# Patient Record
Sex: MALE | Race: WHITE | HISPANIC OR LATINO | URBAN - METROPOLITAN AREA
[De-identification: names, ages, dates, MRNs, and addresses within clinical notes are randomized per-mention and may not be internally consistent; named-entity substitution may affect disease eponyms.]

---

## 2020-06-20 ENCOUNTER — OFFICE VISIT (OUTPATIENT)
Dept: URGENT CARE | Facility: CLINIC | Age: 40
End: 2020-06-20

## 2020-06-20 VITALS
OXYGEN SATURATION: 98 % | BODY MASS INDEX: 31.89 KG/M2 | HEART RATE: 62 BPM | DIASTOLIC BLOOD PRESSURE: 74 MMHG | WEIGHT: 180 LBS | HEIGHT: 63 IN | TEMPERATURE: 98 F | SYSTOLIC BLOOD PRESSURE: 118 MMHG

## 2020-06-20 DIAGNOSIS — S61.211A LACERATION OF LEFT INDEX FINGER, FOREIGN BODY PRESENCE UNSPECIFIED, NAIL DAMAGE STATUS UNSPECIFIED, INITIAL ENCOUNTER: Primary | ICD-10-CM

## 2020-06-20 DIAGNOSIS — S67.191A CRUSHING INJURY OF LEFT INDEX FINGER, INITIAL ENCOUNTER: ICD-10-CM

## 2020-06-20 PROCEDURE — 90715 TDAP VACCINE GREATER THAN OR EQUAL TO 7YO IM: ICD-10-PCS | Mod: S$GLB,,, | Performed by: PHYSICIAN ASSISTANT

## 2020-06-20 PROCEDURE — 12042 LACERATION REPAIR: ICD-10-PCS | Mod: S$GLB,,, | Performed by: PHYSICIAN ASSISTANT

## 2020-06-20 PROCEDURE — 99204 PR OFFICE/OUTPT VISIT, NEW, LEVL IV, 45-59 MIN: ICD-10-PCS | Mod: TIER,25,S$GLB, | Performed by: PHYSICIAN ASSISTANT

## 2020-06-20 PROCEDURE — 12042 INTMD RPR N-HF/GENIT2.6-7.5: CPT | Mod: S$GLB,,, | Performed by: PHYSICIAN ASSISTANT

## 2020-06-20 PROCEDURE — 73130 XR HAND COMPLETE 3 VIEW LEFT: ICD-10-PCS | Mod: FY,LT,S$GLB, | Performed by: RADIOLOGY

## 2020-06-20 PROCEDURE — 90471 TDAP VACCINE GREATER THAN OR EQUAL TO 7YO IM: ICD-10-PCS | Mod: S$GLB,,, | Performed by: PHYSICIAN ASSISTANT

## 2020-06-20 PROCEDURE — 99204 OFFICE O/P NEW MOD 45 MIN: CPT | Mod: TIER,25,S$GLB, | Performed by: PHYSICIAN ASSISTANT

## 2020-06-20 PROCEDURE — 90715 TDAP VACCINE 7 YRS/> IM: CPT | Mod: S$GLB,,, | Performed by: PHYSICIAN ASSISTANT

## 2020-06-20 PROCEDURE — 90471 IMMUNIZATION ADMIN: CPT | Mod: S$GLB,,, | Performed by: PHYSICIAN ASSISTANT

## 2020-06-20 PROCEDURE — 73130 X-RAY EXAM OF HAND: CPT | Mod: FY,LT,S$GLB, | Performed by: RADIOLOGY

## 2020-06-20 RX ORDER — CEPHALEXIN 500 MG/1
500 CAPSULE ORAL EVERY 8 HOURS
Qty: 30 CAPSULE | Refills: 0 | Status: SHIPPED | OUTPATIENT
Start: 2020-06-20 | End: 2020-06-30

## 2020-06-20 RX ORDER — MUPIROCIN 20 MG/G
OINTMENT TOPICAL
Qty: 22 G | Refills: 0 | Status: SHIPPED | OUTPATIENT
Start: 2020-06-20

## 2020-06-20 NOTE — PROCEDURES
Laceration Repair    Date/Time: 6/20/2020 9:35 AM  Performed by: Brie Nair PA-C  Authorized by: Brie Nair PA-C   Body area: upper extremity  Location details: left index finger  Laceration length: 4 cm  Foreign bodies: no foreign bodies  Tendon involvement: none  Nerve involvement: none  Vascular damage: no  Anesthesia: local infiltration    Anesthesia:  Local Anesthetic: lidocaine 2% without epinephrine  Anesthetic total: 5 mL  Patient sedated: no  Preparation: Patient was prepped and draped in the usual sterile fashion.  Irrigation solution: saline  Irrigation method: syringe  Amount of cleaning: extensive  Debridement: none  Degree of undermining: none  Skin closure: 5-0 Prolene (15)  Subcutaneous closure: 5-0 Vicryl (3 sutures placed SubQ)  Number of sutures: 18  Technique: simple  Approximation: close  Approximation difficulty: complex  Dressing: antibiotic ointment and non-stick sterile dressing (wrapped in coban)  Patient tolerance: Patient tolerated the procedure well with no immediate complications

## 2020-06-20 NOTE — PATIENT INSTRUCTIONS
Laceración, Extremidad [Laceration, Extremity, Sutures, Staples, Or Tape]  Bong laceración es bong cortada en la piel. Si es profunda, suele requerir puntos (suturas) o grapas para cerrarla y que pueda cicatrizar. Las cortadas menores pueden tratarse con cinta quirúrgica. Pueden realizarle radiografías si es posible que algo haya entrado en la herida a través del bao. También es posible que necesite que le coloquen la vacuna antitetánica. Se le colocará si no la tiene actualizada.     Cuidados En [place]     · Siga las instrucciones del proveedor de atención médica sobre cómo cuidar de la herida.  · Para ayudar a evitar bong infección, lávese las babak con agua y jabón antes y después de curar la herida.  · Mantenga la herida limpia y seca. Si le colocaron un vendaje y éste se moja o se ensucia, cámbielo. En los demás casos, déjelo puesto kelsey las primeras 24 horas y luego cámbielo bong vez por día o según le indiquen.   · Si usaron puntos o grapas limpie la herida todos los días:  · Después de quitar el vendaje, lave la brittney afectada con agua y jabón. Use un palillo de algodón (Q tip) para ablandar y limpiar la bear o las costras que se hayan formado.  · Después de edgar limpiado, mantenga la herida limpia y seca. Hable con irving médico antes de aplicarse ninguna pomada antibiótica en la herida. Vuelva a poner el vendaje.   · Puede quitar el vendaje para ducharse tej de costumbre bong vez transcurridas 24 horas, bong no moje la brittney afectada en agua (no se sumerja en bañeras o piscinas) hasta que le hayan quitado los puntos o las grapas.  · Si usó cinta quirúrgica, mantenga la brittney limpia y seca. Si se moja, séquela con bong toalla sin frotarla.   · El médico puede recetarle bong crema o pomada antibiótica para evitar bong infección. No deje de yan estos medicamentos hasta completar el tratamiento o hasta que el médico le diga que deje de hacerlo. El médico puede recetarle medicamentos para el dolor. Tómelos de  acuerdo con las indicaciones del médico.  · Evite las actividades que puedan reabrir la herida.      Seguimiento  Programe bong visita de control con irving proveedor de atención médica. La mayoría de las lesiones en la piel sanan en lucia cecily. De todas formas, a veces es posible que ocurra bong infección aún con el tratamiento adecuado. Por lo tanto, revise la herida todos los días para best si muestra alguna de las señales de infección descritas a continuación. Los puntos o las grapas deben quitarse en un plazo de 7-14 días. Si usaron cinta quirúrgica, puede quitársela al cabo de 10 días si no se ha caído todavía.     Busque Atención Médica  Llame de inmediato a irving proveedor de atención médica si algo de lo siguiente ocurre:  · Sangrado que no puede controlarse aplicando presión directa  · Señales de infección, incluidos aumento del dolor en la herida, enrojecimiento, hinchazón o pus o mal olor procedentes de la herida  · Fiebre de 100.4°F (38°C) o más baldo, o tej le haya indicado irving proveedor de atención médica  · Los puntos o las grapas se desprenden o se caen antes de los 7 días  · Los bordes de la herida se abren  · La herida cambia de color  · Adormecimiento alrededor de la herida  · Disminución de la movilidad alrededor de la brittney herida  Date Last Reviewed: 6/14/2015  © 3384-5013 [employer]. [address] [place] [id number]. Todos los derechos reservados. Esta información no pretende sustituir la atención médica profesional. Sólo irving médico puede diagnosticar y tratar un problema de ramon.      COME BACK IN 10 days on 6/30/2020 for stitches to come out.  If you notice any redness, increased swelling, warmth, pus draining from the wound or fever please come back to the urgent care immediately.  Take antibiotics to completion.  Do not get the wound wet for 24 hr.  After 24 hr he may wash the wound with warm water and soap, pat dry and apply mupirocin antibiotic ointment.  Do not apply  ointment after 4 days.  Keep wound covered for the next week.                                                            Laceration Repair   If your condition worsens or fails to improve we recommend that you receive another evaluation at the ER immediately or contact your PCP to discuss your concerns or return here. You must understand that you've received an urgent care treatment only and that you may be released before all your medical problems are known or treated. You the patient will arrange for followup care as instructed.   Use the prescription antibiotic ointment for 2-3 days only. Clean the wound twice a day with betadiene and apply the ointment. After that, only use a dry bandage as the ointment will keep the laceration too moist.   Suture removal on face in 5 days   Suture removal on trunk or extremities in about 10 days.   Monitor for signs of infection such as redness, drainage, increase swelling or increase pain.   If you were given narcotics for pain do not drive or operate heavy equipment or machinery.   Tylenol or ibuprofen can also be used as directed for pain unless you have an allergy to them or medical condition such as stomach ulcers, kidney or liver disease or blood thinners etc for which you should not be taking these type of medications.       Please follow up with your Primary care provider within 2-5 days if your signs and symptoms have not resolved or worsen.     If your condition worsens or fails to improve we recommend that you receive another evaluation at the emergency room immediately or contact your primary medical clinic to discuss your concerns.   You must understand that you have received an Urgent Care treatment only and that you may be released before all of your medical problems are known or treated. You, the patient, will arrange for follow up care as instructed.     RED FLAGS/WARNING SYMPTOMS DISCUSSED WITH PATIENT THAT WOULD WARRANT EMERGENT MEDICAL ATTENTION. PATIENT  VERBALIZED UNDERSTANDING.

## 2020-06-20 NOTE — PROGRESS NOTES
"Subjective:       Patient ID: Danny Wells is a 40 y.o. male.    Vitals:  height is 5' 3" (1.6 m) and weight is 81.6 kg (180 lb). His tympanic temperature is 97.8 °F (36.6 °C). His blood pressure is 118/74 and his pulse is 62. His oxygen saturation is 98%.     Chief Complaint: Laceration    Patient speaks Georgian and his boss accompanied him at time of visit.  His boss translated.  Patient presents to urgent care with a laceration to the distal aspect of his left index finger.  Wound occurred 2 hr prior after his finger was smashed between a piece of steel and forklift.  No object sliced his finger.  Wound was obtained from crushing injury causing increased intra-phalangeal pressure making his distal index finger split open.  Bleeding is well controlled.  Wound was cleaned with bacitracin prior to visit.  Patient is unaware of his tetanus status.  Denies any numbness although expresses intermittent paresthesias.  Patient can make a fist.  Patient is accompanied at time of visit by his boss.  This incident did occur at work however he is not going to charge worker's comp rather he will pay for visit out of pocket.    Laceration   The incident occurred 1 to 3 hours ago (7:30). Pain location: left index finger. Injury mechanism: a piece of steel. The pain is at a severity of 8/10. The pain is moderate. The pain has been constant since onset. It is unknown if a foreign body is present. His tetanus status is out of date.       Constitution: Negative for activity change and fatigue.   HENT: Negative for facial swelling and facial trauma.    Neck: Negative for neck stiffness.   Cardiovascular: Negative for chest trauma.   Eyes: Negative for eye trauma, double vision and blurred vision.   Gastrointestinal: Negative for abdominal trauma, abdominal pain and rectal bleeding.   Genitourinary: Negative for hematuria, genital trauma and pelvic pain.   Musculoskeletal: Negative for pain, trauma, joint swelling, abnormal ROM of " joint and pain with walking.   Skin: Negative for color change, wound, abrasion, laceration and erythema.   Neurological: Negative for dizziness, history of vertigo, light-headedness, coordination disturbances, altered mental status and loss of consciousness.   Hematologic/Lymphatic: Negative for history of bleeding disorder.   Psychiatric/Behavioral: Negative for altered mental status.       Objective:      Physical Exam   Constitutional: He is oriented to person, place, and time. He appears well-developed.   HENT:   Head: Normocephalic and atraumatic. Head is without abrasion, without contusion and without laceration.   Right Ear: External ear normal.   Left Ear: External ear normal.   Nose: Nose normal.   Mouth/Throat: Oropharynx is clear and moist and mucous membranes are normal.   Eyes: Pupils are equal, round, and reactive to light. Conjunctivae, EOM and lids are normal.   Neck: Trachea normal, full passive range of motion without pain and phonation normal. Neck supple.   Cardiovascular: Normal rate, regular rhythm and normal heart sounds.   Pulmonary/Chest: Effort normal and breath sounds normal.   Musculoskeletal:         General: Swelling and tenderness present.      Left hand: He exhibits decreased range of motion (Patient unable to make a closed fist with left hand.), tenderness, laceration and swelling. He exhibits no bony tenderness, normal two-point discrimination, normal capillary refill and no deformity. Normal sensation noted. Decreased sensation is not present in the ulnar distribution, is not present in the medial redistribution and is not present in the radial distribution. Normal strength noted. He exhibits no finger abduction, no thumb/finger opposition and no wrist extension trouble.        Hands:    Neurological: He is alert and oriented to person, place, and time.   Skin: Skin is warm, dry, intact and no rash. Capillary refill takes less than 2 seconds. Lacerations - upper ext.:  left  handabrasion, burn, bruising, erythema and ecchymosis  Psychiatric: His speech is normal and behavior is normal. Judgment and thought content normal.   Nursing note and vitals reviewed.                      LEFT HAND XRAY:    Impression:     1. Laceration along the ulnar aspect of the 5th DIP joint with associated linear lucency extending along the tuft of the distal phalanx concerning for associated penetrating injury.  No radiopaque foreign bodies.    Assessment:       1. Laceration of left index finger, foreign body presence unspecified, nail damage status unspecified, initial encounter    2. Crushing injury of left index finger, initial encounter        Plan:       X-ray obtained to ensure no foreign body or fracture.  X-ray shows no evidence of radiopaque foreign bodies and bones are well mineralized intact without any fracture dislocation.  Reviewed results with patient.  Wound closure performed at time of visit with 3 absorbable and 15 nonabsorbable sutures placed.  Wound was cleaned copious irrigation and Hibiclens.  Injected with 2% lidocaine.  No neurovascular compromise noted.  Patient tolerated the procedure well.  Discussed that a hand surgery referral will be placed at this time for further evaluation of finger injury.  Patient is self pay and discussed the importance of this evaluation.  Discussed warning signs for infection and return to clinic should those present.  Oral Keflex prescribed for empiric empiric coverage.  Discussed not to get sutures wet within 24 hr.  Return to clinic in 10 days for suture removal.  Patient verbalized understanding and all of his questions were answered.    Laceration of left index finger, foreign body presence unspecified, nail damage status unspecified, initial encounter  -     (In Office Administered) Tdap Vaccine  -     XR HAND COMPLETE 3 VIEW LEFT; Future; Expected date: 06/20/2020  -     mupirocin (BACTROBAN) 2 % ointment; Apply to affected area 3 times daily  for 4 days  Dispense: 22 g; Refill: 0  -     cephALEXin (KEFLEX) 500 MG capsule; Take 1 capsule (500 mg total) by mouth every 8 (eight) hours. for 10 days  Dispense: 30 capsule; Refill: 0  -     Ambulatory referral/consult to Hand Surgery  -     Laceration Repair    Crushing injury of left index finger, initial encounter      Patient Instructions     Laceración, Extremidad [Laceration, Extremity, Sutures, Staples, Or Tape]  Bong laceración es bong cortada en la piel. Si es profunda, suele requerir puntos (suturas) o grapas para cerrarla y que pueda cicatrizar. Las cortadas menores pueden tratarse con cinta quirúrgica. Pueden realizarle radiografías si es posible que algo haya entrado en la herida a través del bao. También es posible que necesite que le coloquen la vacuna antitetánica. Se le colocará si no la tiene actualizada.     Cuidados En [place]     · Siga las instrucciones del proveedor de atención médica sobre cómo cuidar de la herida.  · Para ayudar a evitar bong infección, lávese las babak con agua y jabón antes y después de curar la herida.  · Mantenga la herida limpia y seca. Si le colocaron un vendaje y éste se moja o se ensucia, cámbielo. En los demás casos, déjelo puesto kelsey las primeras 24 horas y luego cámbielo bong vez por día o según le indiquen.   · Si usaron puntos o grapas limpie la herida todos los días:  · Después de quitar el vendaje, lave la brittney afectada con agua y jabón. Use un palillo de algodón (Q tip) para ablandar y limpiar la bear o las costras que se hayan formado.  · Después de edgar limpiado, mantenga la herida limpia y seca. Hable con irving médico antes de aplicarse ninguna pomada antibiótica en la herida. Vuelva a poner el vendaje.   · Puede quitar el vendaje para ducharse tej de costumbre bong vez transcurridas 24 horas, bong no moje la brittney afectada en agua (no se sumerja en bañeras o piscinas) hasta que le hayan quitado los puntos o las grapas.  · Si usó cinta quirúrgica,  mantenga la brittney limpia y seca. Si se moja, séquela con bong toalla sin frotarla.   · El médico puede recetarle bong crema o pomada antibiótica para evitar bong infección. No deje de yan estos medicamentos hasta completar el tratamiento o hasta que el médico le diga que deje de hacerlo. El médico puede recetarle medicamentos para el dolor. Tómelos de acuerdo con las indicaciones del médico.  · Evite las actividades que puedan reabrir la herida.      Seguimiento  Programe bong visita de control con irving proveedor de atención médica. La mayoría de las lesiones en la piel sanan en lucia cecily. De todas formas, a veces es posible que ocurra bong infección aún con el tratamiento adecuado. Por lo tanto, revise la herida todos los días para best si muestra alguna de las señales de infección descritas a continuación. Los puntos o las grapas deben quitarse en un plazo de 7-14 días. Si usaron cinta quirúrgica, puede quitársela al cabo de 10 días si no se ha caído todavía.     Busque Atención Médica  Llame de inmediato a irving proveedor de atención médica si algo de lo siguiente ocurre:  · Sangrado que no puede controlarse aplicando presión directa  · Señales de infección, incluidos aumento del dolor en la herida, enrojecimiento, hinchazón o pus o mal olor procedentes de la herida  · Fiebre de 100.4°F (38°C) o más baldo, o tej le haya indicado irving proveedor de atención médica  · Los puntos o las grapas se desprenden o se caen antes de los 7 días  · Los bordes de la herida se abren  · La herida cambia de color  · Adormecimiento alrededor de la herida  · Disminución de la movilidad alrededor de la brittney herida  Date Last Reviewed: 6/14/2015 © 2000-2017 [employer]. [address] [place] [id number]. Todos los derechos reservados. Esta información no pretende sustituir la atención médica profesional. Sólo irving médico puede diagnosticar y tratar un problema de ramon.      COME BACK IN 10 days on 6/30/2020 for stimaríaes to  come out.  If you notice any redness, increased swelling, warmth, pus draining from the wound or fever please come back to the urgent care immediately.  Take antibiotics to completion.  Do not get the wound wet for 24 hr.  After 24 hr he may wash the wound with warm water and soap, pat dry and apply mupirocin antibiotic ointment.  Do not apply ointment after 4 days.  Keep wound covered for the next week.                                                            Laceration Repair   If your condition worsens or fails to improve we recommend that you receive another evaluation at the ER immediately or contact your PCP to discuss your concerns or return here. You must understand that you've received an urgent care treatment only and that you may be released before all your medical problems are known or treated. You the patient will arrange for followup care as instructed.   Use the prescription antibiotic ointment for 2-3 days only. Clean the wound twice a day with betadiene and apply the ointment. After that, only use a dry bandage as the ointment will keep the laceration too moist.   Suture removal on face in 5 days   Suture removal on trunk or extremities in about 10 days.   Monitor for signs of infection such as redness, drainage, increase swelling or increase pain.   If you were given narcotics for pain do not drive or operate heavy equipment or machinery.   Tylenol or ibuprofen can also be used as directed for pain unless you have an allergy to them or medical condition such as stomach ulcers, kidney or liver disease or blood thinners etc for which you should not be taking these type of medications.       Please follow up with your Primary care provider within 2-5 days if your signs and symptoms have not resolved or worsen.     If your condition worsens or fails to improve we recommend that you receive another evaluation at the emergency room immediately or contact your primary medical clinic to discuss your  concerns.   You must understand that you have received an Urgent Care treatment only and that you may be released before all of your medical problems are known or treated. You, the patient, will arrange for follow up care as instructed.     RED FLAGS/WARNING SYMPTOMS DISCUSSED WITH PATIENT THAT WOULD WARRANT EMERGENT MEDICAL ATTENTION. PATIENT VERBALIZED UNDERSTANDING.

## 2020-07-02 ENCOUNTER — CLINICAL SUPPORT (OUTPATIENT)
Dept: URGENT CARE | Facility: CLINIC | Age: 40
End: 2020-07-02

## 2020-07-02 VITALS
HEIGHT: 63 IN | HEART RATE: 101 BPM | TEMPERATURE: 97 F | WEIGHT: 180 LBS | RESPIRATION RATE: 17 BRPM | BODY MASS INDEX: 31.89 KG/M2 | OXYGEN SATURATION: 98 %

## 2020-07-02 DIAGNOSIS — Z48.02 ENCOUNTER FOR REMOVAL OF SUTURES: Primary | ICD-10-CM

## 2020-07-02 PROCEDURE — 99499 UNLISTED E&M SERVICE: CPT | Mod: S$GLB,,, | Performed by: NURSE PRACTITIONER

## 2020-07-02 PROCEDURE — 99024 POSTOP FOLLOW-UP VISIT: CPT | Mod: S$GLB,,, | Performed by: NURSE PRACTITIONER

## 2020-07-02 PROCEDURE — 99024 SUTURE REMOVAL: ICD-10-PCS | Mod: S$GLB,,, | Performed by: NURSE PRACTITIONER

## 2020-07-02 PROCEDURE — 99499 NO LOS: ICD-10-PCS | Mod: S$GLB,,, | Performed by: NURSE PRACTITIONER

## 2020-07-02 RX ORDER — SULFAMETHOXAZOLE AND TRIMETHOPRIM 800; 160 MG/1; MG/1
1 TABLET ORAL 2 TIMES DAILY
Qty: 14 TABLET | Refills: 0 | Status: SHIPPED | OUTPATIENT
Start: 2020-07-02 | End: 2020-07-09

## 2020-07-02 NOTE — PROGRESS NOTES
"Subjective:       Patient ID: Danny Wells is a 40 y.o. male.    Vitals:  height is 5' 3" (1.6 m) and weight is 81.6 kg (180 lb). His tympanic temperature is 97.2 °F (36.2 °C). His pulse is 101. His respiration is 17 and oxygen saturation is 98%.     Chief Complaint: Suture / Staple Removal    Suture / Staple Removal  The sutures were placed 11 to 14 days ago. He tried antibiotic ointment use, oral antibiotics and regular soap and water washings since the wound repair. The maximum temperature noted was less than 100.4 F. The temperature was taken using an oral thermometer. There has been no drainage from the wound. There is no redness present. The swelling has improved. The pain has improved.       Constitution: Negative for fever.   HENT: Negative for ear pain.    Neck: Negative for painful lymph nodes.   Eyes: Negative for eye trauma.   Respiratory: Negative for sleep apnea.    Gastrointestinal: Negative for abdominal trauma.   Endocrine: hair loss.   Genitourinary: Negative for dysuria.   Musculoskeletal: Negative for pain.   Skin: Negative for erythema.   Allergic/Immunologic: Negative for environmental allergies.   Neurological: Negative for dizziness.   Hematologic/Lymphatic: Negative for swollen lymph nodes.       Objective:      Physical Exam   Constitutional: He is oriented to person, place, and time. He appears well-developed.   HENT:   Head: Normocephalic and atraumatic. Head is without abrasion, without contusion and without laceration.   Right Ear: External ear normal.   Left Ear: External ear normal.   Nose: Nose normal.   Mouth/Throat: Oropharynx is clear and moist and mucous membranes are normal.   Eyes: Pupils are equal, round, and reactive to light. Conjunctivae, EOM and lids are normal.   Neck: Trachea normal, full passive range of motion without pain and phonation normal. Neck supple.   Cardiovascular: Normal rate, regular rhythm and normal heart sounds.   Pulmonary/Chest: Effort normal and " breath sounds normal. No stridor. No respiratory distress.   Musculoskeletal: Normal range of motion.        Hands:    Neurological: He is alert and oriented to person, place, and time.   Skin: Skin is warm, dry, intact and no rash. Capillary refill takes less than 2 seconds. abrasion, burn, bruising, erythema and ecchymosis  Psychiatric: His speech is normal and behavior is normal. Judgment and thought content normal.   Nursing note and vitals reviewed.          Advised patient to follow-up with hand surgery as recommended for further evaluation.  I reiterated the importance of following up with Hand surgery.  Strict precautions given to patient to monitor for worsening signs and symptoms. Advised to follow up with primary.All questions answered. Strict ER precautions given. If your symptoms worsens of fail to improve you should go to the Emergency Room. Patient voiced understanding and in agreement with current treatment plan.        Assessment:       1. Encounter for removal of sutures        Plan:         Encounter for removal of sutures    Other orders  -     sulfamethoxazole-trimethoprim 800-160mg (BACTRIM DS) 800-160 mg Tab; Take 1 tablet by mouth 2 (two) times daily. for 7 days  Dispense: 14 tablet; Refill: 0      Patient Instructions   Please return here or go to the Emergency Department for any concerns or worsening of condition.  If you were prescribed antibiotics, please take them to completion.  If you were prescribed a narcotic medication, do not drive or operate heavy equipment or machinery while taking these medications.  Please follow up with your primary care doctor or specialist as needed.  If you  smoke, please stop smoking.  Eliminación De Los Puntos De Sutura [Suture Removal, Infected]  Usted ha tenido bong cam médica para extraerle las suturas. Wood laceración no está sanando de la forma esperada a causa de bong infección en la herida. Incluso con el cuidado adecuado, hasta un 5% de las  laceraciones suturadas se infectan.  Deberá recibir tratamiento con antibióticos. La infección debería empezar a mejorar kelsey los primeros kory días, bong asegúrese de yan todos los antibióticos que le hayan dado hasta terminarlos.  Cuidados En [place]:  1. Mantenga la herida limpia y seca. Cambie el apósito todos los días. Si el apósito se moja o se afsaneh con líquido o con procedente de la herida o suciedad, cámbielo.  ¨ Quite el vendaje usado y lave la herida con agua y jabón.  ¨ Utilice un hisopo de algodón (Q-tip) húmedo para eliminar las costras o secreciones.  ¨ Aplique bong crema o pomada antibiótica tej Polysporin o Bacitracin.  ¨ Vuelva a poner el vendaje.  2. [place] los antibióticos según le hayan indicado hasta que se terminen.  Programe bong VISITA DE CONTROL con irving médico o con gerard centro, según le indiquen. Es importante que vaya a rajesh citas de control para asegurarse de que la herida esté respondiendo al tratamiento con antibióticos.  Busque Prontamente Atención Médica  si algo de lo siguiente ocurre:  · Aumento del dolor, del enrojecimiento o de la hinchazón  · Vetas rojizas alrededor de la herida  · Supuración de líquido o pus procedentes de la herida  · Fiebre de 100.4°F (38.0°C) o más kelsey más de dos días de tratamiento.  Date Last Reviewed: 8/10/2015  © 4421-7545 [employer]. [address] [place] [id number]. Todos los derechos reservados. Esta información no pretende sustituir la atención médica profesional. Sólo irving médico puede diagnosticar y tratar un problema de ramon.

## 2020-07-02 NOTE — PROCEDURES
Suture Removal    Date/Time: 7/2/2020 5:30 PM  Performed by: Cait Gar NP  Authorized by: Cait Gar NP   Body area: upper extremity  Location details: left index finger  Sutures Removed: 15  Post-removal: dressing applied  Facility: sutures placed in this facility  Complications: Yes (tenderness and mild active drainage)  Patient tolerance: Patient tolerated the procedure well with no immediate complications

## 2020-07-02 NOTE — PATIENT INSTRUCTIONS
Please return here or go to the Emergency Department for any concerns or worsening of condition.  If you were prescribed antibiotics, please take them to completion.  If you were prescribed a narcotic medication, do not drive or operate heavy equipment or machinery while taking these medications.  Please follow up with your primary care doctor or specialist as needed.  If you  smoke, please stop smoking.  Eliminación De Los Puntos De Sutura [Suture Removal, Infected]  Usted ha tenido bong cam médica para extraerle las suturas. [place] laceración no está sanando de la forma esperada a causa de bong infección en la herida. Incluso con el cuidado adecuado, hasta un 5% de las laceraciones suturadas se infectan.  Deberá recibir tratamiento con antibióticos. La infección debería empezar a mejorar kelsey los primeros kory días, bong asegúrese de yan todos los antibióticos que le hayan dado hasta terminarlos.  Cuidados En [place]:  1. Mantenga la herida limpia y seca. Cambie el apósito todos los días. Si el apósito se moja o se afsaneh con líquido o con procedente de la herida o suciedad, cámbielo.  ¨ Quite el vendaje usado y lave la herida con agua y jabón.  ¨ Utilice un hisopo de algodón (Q-tip) húmedo para eliminar las costras o secreciones.  ¨ Aplique bong crema o pomada antibiótica tej Polysporin o Bacitracin.  ¨ Vuelva a poner el vendaje.  2. [place] los antibióticos según le hayan indicado hasta que se terminen.  Programe bong VISITA DE CONTROL con irving médico o con gerard centro, según le indiquen. Es importante que vaya a rajesh citas de control para asegurarse de que la herida esté respondiendo al tratamiento con antibióticos.  Busque Prontamente Atención Médica  si algo de lo siguiente ocurre:  · Aumento del dolor, del enrojecimiento o de la hinchazón  · Vetas rojizas alrededor de la herida  · Supuración de líquido o pus procedentes de la herida  · Fiebre de 100.4°F (38.0°C) o más kelsey más de dos días de tratamiento.  Date  Last Reviewed: 8/10/2015  © 0709-2153 [employer]. [address] [place] [address], [place] [address]. Todos los derechos reservados. Esta información no pretende sustituir la atención médica profesional. Sólo irving médico puede diagnosticar y tratar un problema de ramon.